# Patient Record
Sex: MALE | Employment: UNEMPLOYED | URBAN - METROPOLITAN AREA
[De-identification: names, ages, dates, MRNs, and addresses within clinical notes are randomized per-mention and may not be internally consistent; named-entity substitution may affect disease eponyms.]

---

## 2019-12-12 ENCOUNTER — HOSPITAL ENCOUNTER (OUTPATIENT)
Dept: MRI IMAGING | Age: 59
Discharge: HOME | End: 2019-12-12
Attending: OPHTHALMOLOGY
Payer: COMMERCIAL

## 2019-12-12 DIAGNOSIS — H53.2 DIPLOPIA: ICD-10-CM

## 2019-12-12 PROCEDURE — A9575 INJ GADOTERATE MEGLUMI 0.1ML: HCPCS | Performed by: RADIOLOGY

## 2019-12-12 PROCEDURE — 70553 MRI BRAIN STEM W/O & W/DYE: CPT

## 2019-12-12 PROCEDURE — 74011250636 HC RX REV CODE- 250/636: Performed by: RADIOLOGY

## 2019-12-12 RX ORDER — GADOTERATE MEGLUMINE 376.9 MG/ML
20 INJECTION INTRAVENOUS
Status: COMPLETED | OUTPATIENT
Start: 2019-12-12 | End: 2019-12-12

## 2019-12-12 RX ORDER — GADOTERATE MEGLUMINE 376.9 MG/ML
20 INJECTION INTRAVENOUS
Status: DISCONTINUED | OUTPATIENT
Start: 2019-12-12 | End: 2019-12-12

## 2019-12-12 RX ADMIN — GADOTERATE MEGLUMINE 20 ML: 376.9 INJECTION INTRAVENOUS at 17:40

## 2020-05-15 ENCOUNTER — VIRTUAL VISIT (OUTPATIENT)
Dept: ENDOCRINOLOGY | Age: 60
End: 2020-05-15

## 2020-05-15 DIAGNOSIS — E05.90 HYPERTHYROIDISM: Primary | ICD-10-CM

## 2020-05-15 NOTE — PROGRESS NOTES
Chief Complaint   Patient presents with    Thyroid Problem     pcp and pharmacy. Doxy. me              **THIS IS A VIRTUAL VISIT VIA A VIDEO ENCOUNTER. PATIENT AGREED TO HAVE THEIR CARE DELIVERED OVER VIDEO IN PLACE OF THEIR REGULARLY SCHEDULED OFFICE VISIT**      History of Present Illness: Caleb Ayers is a 61 y.o. male who I was asked to see in consult by Dr. Traci Ledesma (ophthalmology) for evaluation of \"elevated T3\". Pt notes he was having double vision \"Dr. Taty Jones did some tests and told me that my T3 level was too high at 293. She said I needed to see a thyroid specialist.  He also saw an eye specialist at [facility], he was diagnosed with an astigmatism, which he notes has improved the double vision. Will request records from Dr. Margo Magaña. Pt first started to not the double vision about a year ago. He notes it started slow and progressed. He denies issues of CP, palpitations, SOB, tremors, diarrhea, constipation, heat or cold intolerance. He denies issues of dysphagia or dysphonia. He notes that he does have GERD and he will cough if he drinks. He notes he has had this issue for about two months. Pt notes that he does get frequent sinus infections and prior to the elevated T3 he notes he had an infection about that time. No prior hx of thyroid issues and no known family hx of thyroid issues. No personal hx of cancer, his father has prostate cancer. Pt was born in [place], he has never lived outside the [address] [place] [address]. No known exposures to ionizing radiation. Past Medical History:   Diagnosis Date    Allergy to alpha-gal      Past Surgical History:   Procedure Laterality Date    HX ANKLE FRACTURE TX Right 2008    HX HEENT      sinus    HX ORTHOPAEDIC  2010    took out upper verterbrae to make room for a disk    HX ROTATOR CUFF REPAIR Right 2018    HX WISDOM TEETH EXTRACTION       No current outpatient medications on file. No current facility-administered medications for this visit.       No Known Allergies  Family History   Problem Relation Age of Onset    Hypertension Mother     High Cholesterol Mother     Hypertension Father     Cancer Father         prostate    No Known Problems Brother     Emphysema Maternal Grandmother     Heart Attack Maternal Grandfather     Alcohol abuse Maternal Grandfather     No Known Problems Paternal Grandmother     Heart Attack Paternal Grandfather     Dementia Brother     Alzheimer Brother      Social History     Socioeconomic History    Marital status: UNKNOWN     Spouse name: Not on file    Number of children: Not on file    Years of education: Not on file    Highest education level: Not on file   Occupational History    Not on file   Social Needs    Financial resource strain: Not on file    Food insecurity     Worry: Not on file     Inability: Not on file   [employer] needs     Medical: Not on file     Non-medical: Not on file   Tobacco Use    Smoking status: Never Smoker    Smokeless tobacco: Never Used   Substance and Sexual Activity    Alcohol use:  Yes     Alcohol/week: 4.0 standard drinks     Types: 4 Cans of beer per week     Frequency: 2-3 times a week     Drinks per session: 3 or 4     Binge frequency: Monthly    Drug use: Not on file    Sexual activity: Not on file   Lifestyle    Physical activity     Days per week: Not on file     Minutes per session: Not on file    Stress: Not on file   Relationships    Social connections     Talks on phone: Not on file     Gets together: Not on file     Attends Nondenominational service: Not on file     Active member of club or organization: Not on file     Attends meetings of clubs or organizations: Not on file     Relationship status: Not on file    Intimate partner violence     Fear of current or ex partner: Not on file     Emotionally abused: Not on file     Physically abused: Not on file     Forced sexual activity: Not on file   Other Topics Concern    Not on file   Social History Narrative    Not on file     Review of Systems:  Negative, except as noted in HPI  Physical Examination:  There were no vitals taken for this visit. - GENERAL: NCAT, Appears well nourished   - EYES: EOMI, non-icteric, no proptosis   - Ear/Nose/Throat: NCAT, no visible inflammation or masses   - CARDIOVASCULAR: no cyanosis, no visible JVD   - RESPIRATORY: respiratory effort normal without any distress or labored breathing   - MUSCULOSKELETAL: Normal ROM of neck and upper extremities observed   - SKIN: No rash on face   - NEUROLOGIC:  No facial asymmetry (Cranial nerve 7 motor function), No gaze palsy   - PSYCHIATRIC: Normal affect, Normal insight and judgement   -     Data Reviewed:   EXAM:  MRI BRAIN W WO CONT     INDICATION:    Diplopia     COMPARISON:  None.     CONTRAST: 20 ml Dotarem.     TECHNIQUE:    Multiplanar multisequence acquisition without and with contrast of the brain.     FINDINGS:  Diffusion imaging does not show acute ischemic changes. There is no extra-axial fluid collection hemorrhage or shift. No significant  white matter changes. Flow-voids in major vessels at the base of the brain are  present. There is a focal area of encephalomalacia which is nonspecific and  could be related to prior ischemia, trauma, or even be congenital in the right  frontal convexity. Doubtful clinical significance. There are no enhancing intracranial lesion or masses.     Special attention to the orbits. Lacrimal glands appear normal, extraocular muscles optic nerves show no  significant abnormality. No fluid collection or masses . Visualized sella and  optic chiasm appear unremarkable.     IMPRESSION   impression: No mass or acute findings. Negative orbits. Assessment/Plan:   1. Hyperthyroidism    1) Pt reports he was told he had an elevated T3 during a work-up for double vision. He has since been diagnosed with an astigmatism and with corrective lenses, the double vision has resolved.   Pt is not having any symptoms concerning for hyperthyroidism (palpitations, tremors, heat intolerance, weight loss, mood swings, irritability) and at the time of the testing he did have a sinus injection. This could have caused a thyroiditis, which would cause a transient and temporary elevation in the Thyroid hormone T3/T4. We discussed at length the Pituitary-Thyroid Axis, and the function of TSH on the thyroid gland and how it is used to help assess thyroid function in the body. Will start by checking a TSH, FT4, TT3, TRAb, TSI and Thyroid Ab. We discussed that if his labs were normal then on further work-up would be needed, but if his labs continued to show thyroid abnormalities, then we would need to do further testing to figure out the underlying etiology. Pt voices understanding and agreement with the plan. RTC based on the above lab results      Patient Instructions          Hyperthyroidism: Care Instructions  Your Care Instructions  Hyperthyroidism occurs when the thyroid gland makes too much thyroid hormone. This speeds up your metabolismhow your body uses energy. This condition can cause you to be very active, lose weight, and have sleep problems, eye problems, and a fast heart rate. It can also cause a goiter. A goiter is an enlarged thyroid gland that you can see at the front of the neck. Hyperthyroidism is often caused by Graves' disease. In Graves' disease, the body's defense (immune) system attacks the thyroid gland. Your doctor may prescribe a beta-blocker medicine to slow your pulse and calm you down. But this is not a treatment for hyperthyroidism. It is given for your fast heart rate. Your doctor may also give you antithyroid medicine. This medicine keeps excess thyroid hormone in check. In some cases, doctors recommend radioactive iodine or surgery to remove the thyroid. After either of these treatments, you may need to take medicine to replace thyroid hormone for the rest of your life.   Follow-up care is a key part of your treatment and safety. Be sure to make and go to all appointments, and call your doctor if you are having problems. It's also a good idea to know your test results and keep a list of the medicines you take. How can you care for yourself at home? · Take your medicines exactly as prescribed. You need to take the thyroid medicine at the same time each day. Call your doctor if you think you are having a problem with your medicine. · Graves' disease can make your eyes sore. Use artificial tears, eye drops, and sunglasses to protect your eyes from dryness, wind, and sun. Raise your head with pillows at night to prevent your eyes from swelling. In some cases, taping your eyelids shut at night will keep your eyes from being dry in the morning. · Make sure you get enough calcium. Foods that are rich in calcium include milk, yogurt, cheese, and dark green vegetables. · If you need to gain weight, ask your doctor about special diets. · Do not eat kelp. Corwin Greening is high in iodine, which can make hyperthyroidism worse. Corwin Greening is commonly used in [employer] and other [employer] foods. You can use iodized salt and eat bread and seafood. Try to eat a balanced diet. · Do not use caffeine and other stimulants. These can make symptoms worse, such as a fast heartbeat, nervousness, and problems focusing. · Do not smoke. Smoking can make your condition worse and may lead to more serious eye problems. If you need help quitting, talk to your doctor about stop-smoking programs and medicines. These can increase your chances of quitting for good. · Lower your stress. Learn to use biofeedback, guided imagery, meditation, or other methods to relax. · Use creams or ointments for irritated skin. Ask your doctor which type to use. · Tell all your doctors about your condition. They need to know because some medicines contain iodine. When should you call for help?   Call your doctor now or seek immediate medical care if:    · You have symptoms of a sudden, very high thyroid level (thyroid storm). These include:  ? Being nauseated, vomiting, and having diarrhea. ? Sweating a lot. ? Feeling extremely restless and confused. ? Having a high fever. ? Having a fast heartbeat.     · You have sudden vision changes or eye pain.     · You have a fever or severe sore throat and are taking antithyroid medicines, such as PTU or methimazole.    Watch closely for changes in your health, and be sure to contact your doctor if:    · You have a sore throat or have problems swallowing.     · You have swollen, itchy, or red eyes or your other eye symptoms get worse, or you have new vision problems.     · You have signs of a low thyroid level (hypothyroidism). You may feel very tired, confused, or weak. Where can you learn more? Go to http://marlin-marcelino.info/  Enter L505 in the search box to learn more about \"Hyperthyroidism: Care Instructions. \"  Current as of: July 28, 2019Content Version: 12.4  © 1813-7828 Healthwise, Incorporated. Care instructions adapted under license by [employer] (which disclaims liability or warranty for this information). If you have questions about a medical condition or this instruction, always ask your healthcare professional. [address] any warranty or liability for your use of this information.             Copy sent to:  Dr. Smart Exon

## 2020-05-15 NOTE — PATIENT INSTRUCTIONS
Hyperthyroidism: Care Instructions Your Care Instructions Hyperthyroidism occurs when the thyroid gland makes too much thyroid hormone. This speeds up your metabolismhow your body uses energy. This condition can cause you to be very active, lose weight, and have sleep problems, eye problems, and a fast heart rate. It can also cause a goiter. A goiter is an enlarged thyroid gland that you can see at the front of the neck. Hyperthyroidism is often caused by Graves' disease. In Graves' disease, the body's defense (immune) system attacks the thyroid gland. Your doctor may prescribe a beta-blocker medicine to slow your pulse and calm you down. But this is not a treatment for hyperthyroidism. It is given for your fast heart rate. Your doctor may also give you antithyroid medicine. This medicine keeps excess thyroid hormone in check. In some cases, doctors recommend radioactive iodine or surgery to remove the thyroid. After either of these treatments, you may need to take medicine to replace thyroid hormone for the rest of your life. Follow-up care is a key part of your treatment and safety. Be sure to make and go to all appointments, and call your doctor if you are having problems. It's also a good idea to know your test results and keep a list of the medicines you take. How can you care for yourself at home? · Take your medicines exactly as prescribed. You need to take the thyroid medicine at the same time each day. Call your doctor if you think you are having a problem with your medicine. · Graves' disease can make your eyes sore. Use artificial tears, eye drops, and sunglasses to protect your eyes from dryness, wind, and sun. Raise your head with pillows at night to prevent your eyes from swelling. In some cases, taping your eyelids shut at night will keep your eyes from being dry in the morning. · Make sure you get enough calcium.  Foods that are rich in calcium include milk, yogurt, cheese, and dark green vegetables. · If you need to gain weight, ask your doctor about special diets. · Do not eat kelp. Quinn Mcfadden is high in iodine, which can make hyperthyroidism worse. Quinn Gash is commonly used in [employer] and other [place] foods. You can use iodized salt and eat bread and seafood. Try to eat a balanced diet. · Do not use caffeine and other stimulants. These can make symptoms worse, such as a fast heartbeat, nervousness, and problems focusing. · Do not smoke. Smoking can make your condition worse and may lead to more serious eye problems. If you need help quitting, talk to your doctor about stop-smoking programs and medicines. These can increase your chances of quitting for good. · Lower your stress. Learn to use biofeedback, guided imagery, meditation, or other methods to relax. · Use creams or ointments for irritated skin. Ask your doctor which type to use. · Tell all your doctors about your condition. They need to know because some medicines contain iodine. When should you call for help? Call your doctor now or seek immediate medical care if: 
  · You have symptoms of a sudden, very high thyroid level (thyroid storm). These include: 
? Being nauseated, vomiting, and having diarrhea. ? Sweating a lot. ? Feeling extremely restless and confused. ? Having a high fever. ? Having a fast heartbeat.  
  · You have sudden vision changes or eye pain.  
  · You have a fever or severe sore throat and are taking antithyroid medicines, such as PTU or methimazole.  
 Watch closely for changes in your health, and be sure to contact your doctor if: 
  · You have a sore throat or have problems swallowing.  
  · You have swollen, itchy, or red eyes or your other eye symptoms get worse, or you have new vision problems.  
  · You have signs of a low thyroid level (hypothyroidism). You may feel very tired, confused, or weak. Where can you learn more? Go to http://marlin-marcelino.info/ Enter F879 in the search box to learn more about \"Hyperthyroidism: Care Instructions. \" Current as of: July 28, 2019Content Version: 12.4 © 8738-7660 Healthwise, Incorporated. Care instructions adapted under license by [employer] (which disclaims liability or warranty for this information). If you have questions about a medical condition or this instruction, always ask your healthcare professional. [address] any warranty or liability for your use of this information.